# Patient Record
Sex: FEMALE | Race: WHITE
[De-identification: names, ages, dates, MRNs, and addresses within clinical notes are randomized per-mention and may not be internally consistent; named-entity substitution may affect disease eponyms.]

---

## 2018-05-09 ENCOUNTER — HOSPITAL ENCOUNTER (EMERGENCY)
Dept: HOSPITAL 80 - FED | Age: 18
LOS: 1 days | Discharge: HOME | End: 2018-05-10
Payer: COMMERCIAL

## 2018-05-09 DIAGNOSIS — K29.70: Primary | ICD-10-CM

## 2018-05-09 NOTE — EDPHY
H & P


Stated Complaint: LUQ abd pain x2 weeks.





- Personal History


LMP (Females 10-55): Now


Current Tetanus/Diphtheria Vaccine: Yes


Current Tetanus Diphtheria and Acellular Pertussis (TDAP): Yes





- Medical/Surgical History


Hx Asthma: No


Hx Chronic Respiratory Disease: No


Hx Diabetes: No


Hx Cardiac Disease: No


Hx Renal Disease: No


Hx Cirrhosis: No


Hx Alcoholism: No


Hx HIV/AIDS: No


Hx Splenectomy or Spleen Trauma: No


Other PMH: Denies.





- Social History


Smoking Status: Never smoked


Time Seen by Provider: 05/09/18 23:24


HPI/ROS: 





CHIEF COMPLAINT:  Left upper quadrant abdominal pain x2 weeks





HISTORY OF PRESENT ILLNESS:  18-year-old female, nonsmoker, positive oral 

contraceptive use, complaining of 2 weeks of left upper quadrant/left lower 

chest pain.  She has also been experiencing mild URI symptoms with intermittent 

cough.  No dyspnea.  No other chest pain.  No fever or chills.  No flu-like 

symptoms.  No back or flank pain.  No urinary symptoms.  No dyspnea.





PRIMARY CARE PROVIDER:  





REVIEW OF SYSTEMS:


A ten point review of systems was performed and is negative with the exception 

of the items mentioned in the HPI








PAST MEDICAL & SURGICAL  HISTORY:  No pertinent medical or surgical history    





SOCIAL HISTORY:  Nonsmoker.  [employer] student.   














************


PHYSICAL EXAM





(Prior to examination, patient consented to physical exam, hands were washed 

and my usual and customary physical exam procedures followed)


1) GENERAL: Well-developed, well-nourished, alert and oriented.  Appears 

uncomfortable specially when she is asked to bruit use


2) HEAD: Normocephalic, atraumatic


3) HEENT: Pupils equal, round, reactive to light bilaterally.  Sclera 

anicteric.  Nasopharynx, oropharynx, clear, no lesions. 


4) NECK: Full range of motion, no meningeal signs.


5) LUNGS: Clear auscultation bilaterally, no wheezes, no rhonchi, no 

retractions.   


6) HEART: Regular rate and rhythm, no murmur, no heave, no gallop.


7) ABDOMEN: No guarding, no rebound, mild tenderness to palpation left upper 

quadrant with deep palpation.  negative McBurney's, negative Krishnan's, negative 

Rovsing's, negative peritoneal sign, no mass no splenomegaly.


8) MUSCULOSKELETAL: Moving all extremities, no focal areas of tenderness, no 

obvious trauma.  No peripheral edema or discoloration.  Negative Homans no 

palpable cord.


9) BACK: No CVA tenderness, no midline vertebral tenderness, no fluctuance, no 

step-off, no obvious trauma, no visual or palpable abnormality. 


10) SKIN: No rash, no petechiae. 


11) Psychiatric:  Patient is oriented X 3, there is no agitation.








***************





DIFFERENTIAL DIAGNOSIS:  In no particular order including but not limited to 

lower lobe pneumonia, pulmonary embolus, constipation, pancreatitis 


 (ANNE Bassett)


Constitutional: 





 Initial Vital Signs











Temperature (C)  36.7 C   05/09/18 23:10


 


Heart Rate  81   05/09/18 23:10


 


Respiratory Rate  16   05/09/18 23:10


 


Blood Pressure  129/92 H  05/09/18 23:10


 


O2 Sat (%)  98   05/09/18 23:10








 











O2 Delivery Mode               Room Air














Allergies/Adverse Reactions: 


 





Penicillins Allergy (Verified 05/09/18 23:14)


 








Home Medications: 














 Medication  Instructions  Recorded


 


Sprintec 28 Day Tablet  05/09/18














Medical Decision Making


ED Course/Re-evaluation: 





11:36 p.m.:  I have evaluated the patient.  She is complaining of left upper 

quadrant pain.  Will obtain laboratory studies including D-dimer, chest x-ray.  

Care of patient under supervision of  secondary supervising physician Dr Marquez 

. 





Midnight:  Care turned over to Dr. Marquez.  Serum studies including D-dimer 

pending. (ANNE Bassett)


Other Provider: 





0005  care assumed from ERIK Bassett.  A 18-year-old presenting with left upper 

and epigastric abdominal pain with some possible chest discomfort as well.  

Chest x-ray is negative.  D-dimer is normal.  Patient had blood drawn at 

[facility], presumably checking for H pylori infection.  They have not 

recommended any medications.  She has some mild tenderness on exam in her 

epigastrium.  Symptoms consistent with peptic ulcer disease or gastritis.  Will 

start her on Pepcid available over-the-counter.  She has been given 1 here.  

She will follow up with [facility] for further evaluation. (Levi Marquez)





- Data Points


Laboratory Results: 





 Laboratory Results





 05/09/18 23:45 





 











  05/09/18 05/09/18 05/09/18





  23:45 23:45 23:45


 


WBC      





    


 


RBC      





    


 


Hgb      





    


 


Hct      





    


 


MCV      





    


 


MCH      





    


 


MCHC      





    


 


RDW      





    


 


Plt Count      





    


 


MPV      





    


 


Neut % (Auto)      





    


 


Lymph % (Auto)      





    


 


Mono % (Auto)      





    


 


Eos % (Auto)      





    


 


Baso % (Auto)      





    


 


Nucleat RBC Rel Count      





    


 


Absolute Neuts (auto)      





    


 


Absolute Lymphs (auto)      





    


 


Absolute Monos (auto)      





    


 


Absolute Eos (auto)      





    


 


Absolute Basos (auto)      





    


 


Absolute Nucleated RBC      





    


 


Immature Gran %      





    


 


Immature Gran #      





    


 


D-Dimer      < 0.27 ug/mLFEU ug/mLFEU





     (0.00-0.50) 


 


Sodium    Pending   





    


 


Potassium    Pending   





    


 


Chloride    Pending   





    


 


Carbon Dioxide    Pending   





    


 


Anion Gap    Pending   





    


 


BUN    Pending   





    


 


Creatinine    Pending   





    


 


Estimated GFR    Pending   





    


 


Glucose    Pending   





    


 


Calcium    Pending   





    


 


Total Bilirubin    Pending   





    


 


Conjugated Bilirubin    Pending   





    


 


Unconjugated Bilirubin    Pending   





    


 


AST    Pending   





    


 


ALT    Pending   





    


 


Alkaline Phosphatase    Pending   





    


 


Total Protein    Pending   





    


 


Albumin    Pending   





    


 


Lipase    Pending   





    


 


Beta HCG, Qual  NEGATIVE     





    














  05/09/18





  23:45


 


WBC  7.80 10^3/uL 10^3/uL





   (3.80-9.50) 


 


RBC  4.35 10^6/uL 10^6/uL





   (4.18-5.33) 


 


Hgb  13.6 g/dL g/dL





   (12.6-16.3) 


 


Hct  37.9 % L %





   (38.0-47.0) 


 


MCV  87.1 fL fL





   (81.5-99.8) 


 


MCH  31.3 pg pg





   (27.9-34.1) 


 


MCHC  35.9 g/dL g/dL





   (32.4-36.7) 


 


RDW  12.3 % %





   (11.5-15.2) 


 


Plt Count  224 10^3/uL 10^3/uL





   (150-400) 


 


MPV  9.4 fL fL





   (8.7-11.7) 


 


Neut % (Auto)  69.7 % %





   (39.3-74.2) 


 


Lymph % (Auto)  21.3 % %





   (15.0-45.0) 


 


Mono % (Auto)  7.3 % %





   (4.5-13.0) 


 


Eos % (Auto)  1.3 % %





   (0.6-7.6) 


 


Baso % (Auto)  0.3 % %





   (0.3-1.7) 


 


Nucleat RBC Rel Count  0.0 % %





   (0.0-0.2) 


 


Absolute Neuts (auto)  5.44 10^3/uL 10^3/uL





   (1.70-6.50) 


 


Absolute Lymphs (auto)  1.66 10^3/uL 10^3/uL





   (1.00-3.00) 


 


Absolute Monos (auto)  0.57 10^3/uL 10^3/uL





   (0.30-0.80) 


 


Absolute Eos (auto)  0.10 10^3/uL 10^3/uL





   (0.03-0.40) 


 


Absolute Basos (auto)  0.02 10^3/uL 10^3/uL





   (0.02-0.10) 


 


Absolute Nucleated RBC  0.00 10^3/uL 10^3/uL





   (0-0.01) 


 


Immature Gran %  0.1 % %





   (0.0-1.1) 


 


Immature Gran #  0.01 10^3/uL 10^3/uL





   (0.00-0.10) 


 


D-Dimer  





  


 


Sodium  





  


 


Potassium  





  


 


Chloride  





  


 


Carbon Dioxide  





  


 


Anion Gap  





  


 


BUN  





  


 


Creatinine  





  


 


Estimated GFR  





  


 


Glucose  





  


 


Calcium  





  


 


Total Bilirubin  





  


 


Conjugated Bilirubin  





  


 


Unconjugated Bilirubin  





  


 


AST  





  


 


ALT  





  


 


Alkaline Phosphatase  





  


 


Total Protein  





  


 


Albumin  





  


 


Lipase  





  


 


Beta HCG, Qual  





  














Departure





- Departure


Disposition: Home, Routine, Self-Care


Clinical Impression: 


 Gastritis





Condition: Good


Instructions:  Diet for Stomach Ulcers and Gastritis (ED), Gastritis (ED)


Additional Instructions: 


You may take Pepcid, available over-the-counter, per package instructions for 

any abdominal discomfort.


Follow up with [facility] for the results of your blood test.


[facility]errals: 


[facility] [[facility] - As per Instructions

## 2018-05-10 VITALS — SYSTOLIC BLOOD PRESSURE: 122 MMHG | DIASTOLIC BLOOD PRESSURE: 82 MMHG

## 2018-05-10 LAB — PLATELET # BLD: 224 10^3/UL (ref 150–400)
